# Patient Record
Sex: FEMALE | Race: OTHER
[De-identification: names, ages, dates, MRNs, and addresses within clinical notes are randomized per-mention and may not be internally consistent; named-entity substitution may affect disease eponyms.]

---

## 2019-11-07 ENCOUNTER — HOSPITAL ENCOUNTER (EMERGENCY)
Dept: HOSPITAL 8 - ED | Age: 24
LOS: 1 days | Discharge: HOME | End: 2019-11-08
Payer: COMMERCIAL

## 2019-11-07 VITALS — WEIGHT: 153.66 LBS | BODY MASS INDEX: 24.12 KG/M2 | HEIGHT: 67 IN

## 2019-11-07 DIAGNOSIS — T16.1XXA: Primary | ICD-10-CM
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
## 2019-11-07 PROCEDURE — 69200 CLEAR OUTER EAR CANAL: CPT

## 2019-11-07 PROCEDURE — 99284 EMERGENCY DEPT VISIT MOD MDM: CPT

## 2019-11-07 NOTE — NUR
SPOKE WITH FLIP FROM PHARMACY, UNABLE TO DETERMINE IF VISCOUS LIDO IS SAFE TO US 
IN EAR.  FLIP ALSO SPOKE WITH AMINATA LYNCH.  AMINATA LYNCH AT  NOW TO PLACE REGULAR 
LIDO INTO EAR PRIOR TO EAR IRRIGATION.

## 2019-11-07 NOTE — NUR
PER PHARMACY VISCOUS LIDO IN OMNICELL; CALLED PHARMACY BACK TO ENSURE IT IS OK 
TO PUT IN EAR AS PACKAGE STATES ORAL USE ONLY.

## 2019-11-07 NOTE — NUR
JAY DIETZ IN TO ATTEMPT TO EXTRACT FINGERNAIL FB FROM EAR.  UNSUCCESSFUL; MORE 
LIDO ADDED TO EAR BY JAY DIETZ.  WILL RE-ATTEMPT IN ABOUT 10 MINUTES.

## 2019-11-08 VITALS — SYSTOLIC BLOOD PRESSURE: 133 MMHG | DIASTOLIC BLOOD PRESSURE: 89 MMHG

## 2019-11-08 NOTE — NUR
JAY DIETZ AND THIS RN AT . JAY DIETZ ATTEMPTED AGAIN TO GET FB OUT OF EAR.  
PT. VERY TEARFUL AND SHOUTING "I CAN'T"  PT. REFUSING TO LET MIRELA ATTEMPT TO 
GET FB OUT AGAIN.  EMOTIONAL SUPPORT OFFERED.

## 2019-11-08 NOTE — NUR
PT. MEDICATED PER MAR.  RECEIVED ALLIGATOR FORCEPS FROM OR FOR JAY DIETZ. 

PT. SONAM. REASSURANCE OFFERED.

## 2019-11-08 NOTE — NUR
EAR IRRIGATION PERFORMED BY THIS RN AND JAY DIETZ.  UNABLE TO DISLODGE FB.  
PT. NOT TOLERATING IRRIGATION R/T PAIN.  JAY DIETZ WORKING ON A PLAN TO REMOVE 
FB WITH ERMD.